# Patient Record
Sex: MALE | Race: BLACK OR AFRICAN AMERICAN | URBAN - METROPOLITAN AREA
[De-identification: names, ages, dates, MRNs, and addresses within clinical notes are randomized per-mention and may not be internally consistent; named-entity substitution may affect disease eponyms.]

---

## 2023-01-25 ENCOUNTER — WEB ENCOUNTER (OUTPATIENT)
Dept: URBAN - METROPOLITAN AREA CLINIC 92 | Facility: CLINIC | Age: 33
End: 2023-01-25

## 2023-01-25 ENCOUNTER — OFFICE VISIT (OUTPATIENT)
Dept: URBAN - METROPOLITAN AREA CLINIC 92 | Facility: CLINIC | Age: 33
End: 2023-01-25
Payer: COMMERCIAL

## 2023-01-25 ENCOUNTER — TELEPHONE ENCOUNTER (OUTPATIENT)
Dept: URBAN - METROPOLITAN AREA CLINIC 92 | Facility: CLINIC | Age: 33
End: 2023-01-25

## 2023-01-25 VITALS
HEIGHT: 67 IN | HEART RATE: 90 BPM | TEMPERATURE: 97.2 F | WEIGHT: 143 LBS | BODY MASS INDEX: 22.44 KG/M2 | DIASTOLIC BLOOD PRESSURE: 82 MMHG | SYSTOLIC BLOOD PRESSURE: 130 MMHG

## 2023-01-25 DIAGNOSIS — R10.9 ABDOMINAL PAIN: ICD-10-CM

## 2023-01-25 PROCEDURE — 99203 OFFICE O/P NEW LOW 30 MIN: CPT | Performed by: INTERNAL MEDICINE

## 2023-01-25 RX ORDER — OMEPRAZOLE 40 MG/1
1 CAPSULE 30 MINUTES BEFORE MORNING MEAL CAPSULE, DELAYED RELEASE ORAL ONCE A DAY
Qty: 30 | Refills: 11

## 2023-01-25 RX ORDER — FAMOTIDINE 20 MG/1
1 TABLET AT BEDTIME AS NEEDED TABLET, FILM COATED ORAL ONCE A DAY
Status: ACTIVE | COMMUNITY

## 2023-01-25 RX ORDER — OMEPRAZOLE 40 MG/1
1 CAPSULE 30 MINUTES BEFORE MORNING MEAL CAPSULE, DELAYED RELEASE ORAL ONCE A DAY
Qty: 30 | OUTPATIENT
Start: 2023-01-25

## 2023-01-25 NOTE — HPI-TODAY'S VISIT:
new pt   Abd pain Location epig Quality dull discomfort Frequency intermittent Duration 14years, q2wks, lasts 30min Radiation none Severity mild Exacerbating factors cold temps, hunger Relieving factors pepcid, magnesium   Denies N/V diarrhea constipation hematochezia melena jaundice unintentional wt loss   Denies dyspepsia htbrn dysphagia odynophagia food impaction CP cough anorexia light headedness   Denies scleral icterus, increased abd girth, LE edema, confusion, disorientation, memory loss, hematemesis  from [facility]  [place]

## 2023-03-12 ENCOUNTER — DASHBOARD ENCOUNTERS (OUTPATIENT)
Age: 33
End: 2023-03-12

## 2023-03-12 ENCOUNTER — TELEPHONE ENCOUNTER (OUTPATIENT)
Dept: URBAN - METROPOLITAN AREA CLINIC 92 | Facility: CLINIC | Age: 33
End: 2023-03-12

## 2023-03-16 ENCOUNTER — OFFICE VISIT (OUTPATIENT)
Dept: URBAN - METROPOLITAN AREA TELEHEALTH 2 | Facility: TELEHEALTH | Age: 33
End: 2023-03-16

## 2023-03-16 RX ORDER — OMEPRAZOLE 40 MG/1
1 CAPSULE 30 MINUTES BEFORE MORNING MEAL CAPSULE, DELAYED RELEASE ORAL ONCE A DAY
Qty: 30 | OUTPATIENT

## 2023-03-16 RX ORDER — FAMOTIDINE 20 MG/1
1 TABLET AT BEDTIME AS NEEDED TABLET, FILM COATED ORAL ONCE A DAY
COMMUNITY

## 2023-03-16 RX ORDER — OMEPRAZOLE 40 MG/1
1 CAPSULE 30 MINUTES BEFORE MORNING MEAL CAPSULE, DELAYED RELEASE ORAL ONCE A DAY
Qty: 30 | Refills: 11 | COMMUNITY

## 2023-03-16 NOTE — HPI-TODAY'S VISIT:
wt __# over 2mo (was 143)  EGD ordered, not yet scheduled  on PPI,   Abd pain ____________ Location epig Quality dull discomfort Frequency intermittent Duration 14years, q2wks, lasts 30min Radiation none Severity mild Exacerbating factors cold temps, hunger Relieving factors pepcid, magnesium   Denies N/V diarrhea constipation hematochezia melena jaundice unintentional wt loss   Denies dyspepsia htbrn dysphagia odynophagia food impaction CP cough anorexia light headedness   Denies scleral icterus, increased abd girth, LE edema, confusion, disorientation, memory loss, hematemesis  from [facility]  [place]